# Patient Record
Sex: FEMALE | Race: WHITE | NOT HISPANIC OR LATINO | URBAN - METROPOLITAN AREA
[De-identification: names, ages, dates, MRNs, and addresses within clinical notes are randomized per-mention and may not be internally consistent; named-entity substitution may affect disease eponyms.]

---

## 2024-03-20 PROBLEM — Z86.010 SURVEILLANCE DUE TO PRIOR COLONIC NEOPLASIA: Status: ACTIVE | Noted: 2024-03-22

## 2024-03-22 ENCOUNTER — OFFICE (OUTPATIENT)
Dept: URBAN - METROPOLITAN AREA PATHOLOGY 2 | Facility: PATHOLOGY | Age: 54
End: 2024-03-22
Payer: COMMERCIAL

## 2024-03-22 ENCOUNTER — AMBULATORY SURGICAL CENTER (OUTPATIENT)
Dept: URBAN - METROPOLITAN AREA SURGERY 12 | Facility: SURGERY | Age: 54
End: 2024-03-22
Payer: COMMERCIAL

## 2024-03-22 VITALS
DIASTOLIC BLOOD PRESSURE: 35 MMHG | DIASTOLIC BLOOD PRESSURE: 46 MMHG | SYSTOLIC BLOOD PRESSURE: 79 MMHG | HEART RATE: 53 BPM | SYSTOLIC BLOOD PRESSURE: 116 MMHG | HEART RATE: 70 BPM | DIASTOLIC BLOOD PRESSURE: 74 MMHG | HEART RATE: 58 BPM | HEART RATE: 59 BPM | DIASTOLIC BLOOD PRESSURE: 37 MMHG | DIASTOLIC BLOOD PRESSURE: 71 MMHG | RESPIRATION RATE: 16 BRPM | HEART RATE: 70 BPM | DIASTOLIC BLOOD PRESSURE: 74 MMHG | DIASTOLIC BLOOD PRESSURE: 39 MMHG | SYSTOLIC BLOOD PRESSURE: 83 MMHG | SYSTOLIC BLOOD PRESSURE: 119 MMHG | SYSTOLIC BLOOD PRESSURE: 91 MMHG | OXYGEN SATURATION: 99 % | SYSTOLIC BLOOD PRESSURE: 80 MMHG | HEART RATE: 56 BPM | SYSTOLIC BLOOD PRESSURE: 135 MMHG | SYSTOLIC BLOOD PRESSURE: 84 MMHG | OXYGEN SATURATION: 100 % | DIASTOLIC BLOOD PRESSURE: 37 MMHG | WEIGHT: 160 LBS | SYSTOLIC BLOOD PRESSURE: 83 MMHG | OXYGEN SATURATION: 99 % | HEART RATE: 56 BPM | OXYGEN SATURATION: 100 % | HEART RATE: 68 BPM | HEIGHT: 68 IN | HEART RATE: 55 BPM | RESPIRATION RATE: 9 BRPM | SYSTOLIC BLOOD PRESSURE: 97 MMHG | RESPIRATION RATE: 18 BRPM | SYSTOLIC BLOOD PRESSURE: 131 MMHG | DIASTOLIC BLOOD PRESSURE: 68 MMHG | HEART RATE: 68 BPM | TEMPERATURE: 98.3 F | OXYGEN SATURATION: 97 % | SYSTOLIC BLOOD PRESSURE: 131 MMHG | SYSTOLIC BLOOD PRESSURE: 88 MMHG | SYSTOLIC BLOOD PRESSURE: 80 MMHG | DIASTOLIC BLOOD PRESSURE: 74 MMHG | RESPIRATION RATE: 9 BRPM | RESPIRATION RATE: 16 BRPM | DIASTOLIC BLOOD PRESSURE: 44 MMHG | OXYGEN SATURATION: 97 % | OXYGEN SATURATION: 98 % | HEART RATE: 53 BPM | HEART RATE: 57 BPM | DIASTOLIC BLOOD PRESSURE: 32 MMHG | HEIGHT: 68 IN | HEART RATE: 56 BPM | RESPIRATION RATE: 7 BRPM | HEIGHT: 68 IN | HEART RATE: 58 BPM | DIASTOLIC BLOOD PRESSURE: 94 MMHG | RESPIRATION RATE: 18 BRPM | DIASTOLIC BLOOD PRESSURE: 75 MMHG | WEIGHT: 160 LBS | DIASTOLIC BLOOD PRESSURE: 55 MMHG | SYSTOLIC BLOOD PRESSURE: 80 MMHG | SYSTOLIC BLOOD PRESSURE: 116 MMHG | SYSTOLIC BLOOD PRESSURE: 91 MMHG | SYSTOLIC BLOOD PRESSURE: 137 MMHG | SYSTOLIC BLOOD PRESSURE: 152 MMHG | DIASTOLIC BLOOD PRESSURE: 68 MMHG | SYSTOLIC BLOOD PRESSURE: 135 MMHG | SYSTOLIC BLOOD PRESSURE: 119 MMHG | SYSTOLIC BLOOD PRESSURE: 93 MMHG | DIASTOLIC BLOOD PRESSURE: 34 MMHG | SYSTOLIC BLOOD PRESSURE: 137 MMHG | RESPIRATION RATE: 3 BRPM | DIASTOLIC BLOOD PRESSURE: 44 MMHG | DIASTOLIC BLOOD PRESSURE: 42 MMHG | RESPIRATION RATE: 7 BRPM | HEART RATE: 59 BPM | SYSTOLIC BLOOD PRESSURE: 84 MMHG | DIASTOLIC BLOOD PRESSURE: 37 MMHG | RESPIRATION RATE: 9 BRPM | OXYGEN SATURATION: 100 % | DIASTOLIC BLOOD PRESSURE: 42 MMHG | TEMPERATURE: 98.3 F | HEART RATE: 59 BPM | SYSTOLIC BLOOD PRESSURE: 91 MMHG | HEART RATE: 53 BPM | HEART RATE: 57 BPM | SYSTOLIC BLOOD PRESSURE: 152 MMHG | SYSTOLIC BLOOD PRESSURE: 83 MMHG | DIASTOLIC BLOOD PRESSURE: 46 MMHG | HEART RATE: 51 BPM | SYSTOLIC BLOOD PRESSURE: 97 MMHG | SYSTOLIC BLOOD PRESSURE: 88 MMHG | DIASTOLIC BLOOD PRESSURE: 44 MMHG | DIASTOLIC BLOOD PRESSURE: 68 MMHG | SYSTOLIC BLOOD PRESSURE: 79 MMHG | DIASTOLIC BLOOD PRESSURE: 71 MMHG | SYSTOLIC BLOOD PRESSURE: 119 MMHG | DIASTOLIC BLOOD PRESSURE: 46 MMHG | DIASTOLIC BLOOD PRESSURE: 94 MMHG | SYSTOLIC BLOOD PRESSURE: 116 MMHG | DIASTOLIC BLOOD PRESSURE: 34 MMHG | DIASTOLIC BLOOD PRESSURE: 34 MMHG | DIASTOLIC BLOOD PRESSURE: 41 MMHG | HEART RATE: 64 BPM | RESPIRATION RATE: 8 BRPM | OXYGEN SATURATION: 94 % | HEART RATE: 51 BPM | DIASTOLIC BLOOD PRESSURE: 75 MMHG | SYSTOLIC BLOOD PRESSURE: 93 MMHG | RESPIRATION RATE: 13 BRPM | HEART RATE: 71 BPM | SYSTOLIC BLOOD PRESSURE: 135 MMHG | RESPIRATION RATE: 8 BRPM | DIASTOLIC BLOOD PRESSURE: 35 MMHG | RESPIRATION RATE: 13 BRPM | HEART RATE: 64 BPM | TEMPERATURE: 98.3 F | RESPIRATION RATE: 6 BRPM | HEART RATE: 55 BPM | SYSTOLIC BLOOD PRESSURE: 137 MMHG | OXYGEN SATURATION: 94 % | SYSTOLIC BLOOD PRESSURE: 152 MMHG | SYSTOLIC BLOOD PRESSURE: 88 MMHG | OXYGEN SATURATION: 94 % | HEART RATE: 71 BPM | OXYGEN SATURATION: 98 % | HEART RATE: 58 BPM | SYSTOLIC BLOOD PRESSURE: 97 MMHG | HEART RATE: 68 BPM | DIASTOLIC BLOOD PRESSURE: 39 MMHG | RESPIRATION RATE: 16 BRPM | DIASTOLIC BLOOD PRESSURE: 71 MMHG | RESPIRATION RATE: 18 BRPM | DIASTOLIC BLOOD PRESSURE: 39 MMHG | RESPIRATION RATE: 6 BRPM | DIASTOLIC BLOOD PRESSURE: 41 MMHG | RESPIRATION RATE: 7 BRPM | DIASTOLIC BLOOD PRESSURE: 55 MMHG | SYSTOLIC BLOOD PRESSURE: 75 MMHG | SYSTOLIC BLOOD PRESSURE: 75 MMHG | HEART RATE: 51 BPM | DIASTOLIC BLOOD PRESSURE: 42 MMHG | DIASTOLIC BLOOD PRESSURE: 35 MMHG | OXYGEN SATURATION: 97 % | DIASTOLIC BLOOD PRESSURE: 94 MMHG | WEIGHT: 160 LBS | OXYGEN SATURATION: 99 % | OXYGEN SATURATION: 98 % | SYSTOLIC BLOOD PRESSURE: 75 MMHG | DIASTOLIC BLOOD PRESSURE: 55 MMHG | HEART RATE: 64 BPM | RESPIRATION RATE: 3 BRPM | SYSTOLIC BLOOD PRESSURE: 93 MMHG | DIASTOLIC BLOOD PRESSURE: 32 MMHG | HEART RATE: 57 BPM | RESPIRATION RATE: 6 BRPM | DIASTOLIC BLOOD PRESSURE: 41 MMHG | SYSTOLIC BLOOD PRESSURE: 79 MMHG | HEART RATE: 71 BPM | RESPIRATION RATE: 8 BRPM | RESPIRATION RATE: 13 BRPM | HEART RATE: 70 BPM | HEART RATE: 55 BPM | DIASTOLIC BLOOD PRESSURE: 32 MMHG | DIASTOLIC BLOOD PRESSURE: 75 MMHG | RESPIRATION RATE: 3 BRPM | SYSTOLIC BLOOD PRESSURE: 84 MMHG | SYSTOLIC BLOOD PRESSURE: 131 MMHG

## 2024-03-22 DIAGNOSIS — Z86.010 PERSONAL HISTORY OF COLONIC POLYPS: ICD-10-CM

## 2024-03-22 DIAGNOSIS — D12.0 BENIGN NEOPLASM OF CECUM: ICD-10-CM

## 2024-03-22 DIAGNOSIS — K63.5 POLYP OF COLON: ICD-10-CM

## 2024-03-22 DIAGNOSIS — Z09 ENCOUNTER FOR FOLLOW-UP EXAMINATION AFTER COMPLETED TREATMEN: ICD-10-CM

## 2024-03-22 DIAGNOSIS — K64.8 OTHER HEMORRHOIDS: ICD-10-CM

## 2024-03-22 DIAGNOSIS — K57.30 DIVERTICULOSIS OF LARGE INTESTINE WITHOUT PERFORATION OR ABS: ICD-10-CM

## 2024-03-22 PROCEDURE — 45385 COLONOSCOPY W/LESION REMOVAL: CPT | Performed by: INTERNAL MEDICINE

## 2024-03-22 PROCEDURE — 88305 TISSUE EXAM BY PATHOLOGIST: CPT | Performed by: PATHOLOGY

## 2024-10-17 ENCOUNTER — APPOINTMENT (OUTPATIENT)
Dept: DERMATOLOGY | Facility: CLINIC | Age: 54
End: 2024-10-17
Payer: COMMERCIAL

## 2024-10-17 DIAGNOSIS — Z12.83 SCREENING EXAM FOR SKIN CANCER: ICD-10-CM

## 2024-10-17 DIAGNOSIS — L82.1 SEBORRHEIC KERATOSIS: ICD-10-CM

## 2024-10-17 DIAGNOSIS — D22.9 NEVUS: Primary | ICD-10-CM

## 2024-10-17 DIAGNOSIS — L43.9 LICHEN PLANUS: ICD-10-CM

## 2024-10-17 DIAGNOSIS — L81.4 LENTIGO: ICD-10-CM

## 2024-10-17 PROCEDURE — 99214 OFFICE O/P EST MOD 30 MIN: CPT | Performed by: NURSE PRACTITIONER

## 2024-10-17 RX ORDER — TACROLIMUS 1 MG/G
OINTMENT TOPICAL 2 TIMES DAILY
Qty: 60 G | Refills: 2 | Status: SHIPPED | OUTPATIENT
Start: 2024-10-17 | End: 2025-10-17

## 2024-10-17 RX ORDER — CLOBETASOL PROPIONATE 0.5 MG/G
CREAM TOPICAL 2 TIMES DAILY
Qty: 60 G | Refills: 3 | Status: SHIPPED | OUTPATIENT
Start: 2024-10-17 | End: 2024-10-31

## 2024-10-17 NOTE — PROGRESS NOTES
Dictation #1  MRN:[id number]  CSN:[id number] Subjective     Jing Su is a 54 y.o. female who presents for the following: Skin Check and lichen planus.   Established patient in for follow up on lichen planus last seen and prescribed to use tacrolimus , has used in the past - clobetasol and otc lotions.   Patient would like a skin exam today as well.     Review of Systems:  No other skin or systemic complaints other than what is documented elsewhere in the note.    The following portions of the chart were reviewed this encounter and updated as appropriate:  Allergies       Skin Cancer History  No skin cancer on file.    Specialty Problems    None    Past Medical History:  Jing Su  has no past medical history on file.    Past Surgical History:  Jing Su  has no past surgical history on file.    Family History:  Patient family history is not on file.    Social History:  Jing Su  has no history on file for tobacco use, alcohol use, and drug use.    Allergies:  Patient has no known allergies.    Current Medications / CAM's:    Current Outpatient Medications:     clobetasol (Temovate) 0.05 % cream, Apply topically 2 times a day for 14 days., Disp: 60 g, Rfl: 3    tacrolimus (Protopic) 0.1 % ointment, Apply topically 2 times a day., Disp: 60 g, Rfl: 2     Objective   Well appearing patient in no apparent distress; mood and affect are within normal limits.      Assessment/Plan   1. Nevus  Uniform pigmented macule(s)/papule(s) with reassuring findings on dermoscopy    -Discussed nature of condition  -Reassurance, benign-appearing features on examination today  -Recommend continued observation    2. Lentigo  Tan macules    -Benign appearing on exam  -Reassurance, recommend observation    3. Seborrheic keratosis  Stuck on, waxy macule(s)/papule(s)/plaque(s) with comedo-like openings and milia like cysts    -Discussed nature of condition  -Reassurance, recommend continued observation    4. Screening  exam for skin cancer    5. Lichen planus  Left Lower Leg - Anterior, Right Lower Leg - Anterior  Violaceous, polygonal papule(s)/plaque(s) with Mayco's striae    -Discussed nature of condition  -Recommend:    Related Medications  clobetasol (Temovate) 0.05 % cream  Apply topically 2 times a day for 14 days.    tacrolimus (Protopic) 0.1 % ointment  Apply topically 2 times a day.

## 2025-04-01 LAB
NON-UH HIE ALB: 4 G/DL (ref 3.4–5)
NON-UH HIE ALK PHOS: 73 UNIT/L (ref 45–117)
NON-UH HIE BILIRUBIN, DIRECT: 0.17 MG/DL (ref 0–0.4)
NON-UH HIE BILIRUBIN, TOTAL: 0.7 MG/DL (ref 0.3–1.2)
NON-UH HIE CALCULATED LDL CHOLESTEROL: 147 MG/DL (ref 60–130)
NON-UH HIE CHOLESTEROL: 223 MG/DL (ref 100–200)
NON-UH HIE GOT: 30 UNIT/L (ref 15–37)
NON-UH HIE GPT: 44 UNIT/L (ref 10–49)
NON-UH HIE HDL CHOLESTEROL: 43 MG/DL (ref 40–60)
NON-UH HIE HGB A1C: 5.8 %
NON-UH HIE TOTAL CHOL/HDL CHOL RATIO: 5.2
NON-UH HIE TOTAL PROTEIN: 7.4 G/DL (ref 5.7–8.2)
NON-UH HIE TRIGLYCERIDES: 163 MG/DL (ref 30–150)

## 2025-04-14 LAB — NON-UH HIE MISC SENDOUT: NORMAL

## 2025-10-23 ENCOUNTER — APPOINTMENT (OUTPATIENT)
Dept: DERMATOLOGY | Facility: CLINIC | Age: 55
End: 2025-10-23
Payer: COMMERCIAL